# Patient Record
Sex: FEMALE | Race: BLACK OR AFRICAN AMERICAN | NOT HISPANIC OR LATINO | Employment: UNEMPLOYED | URBAN - METROPOLITAN AREA
[De-identification: names, ages, dates, MRNs, and addresses within clinical notes are randomized per-mention and may not be internally consistent; named-entity substitution may affect disease eponyms.]

---

## 2023-06-15 ENCOUNTER — APPOINTMENT (OUTPATIENT)
Dept: CT IMAGING | Facility: CLINIC | Age: 38
End: 2023-06-15
Attending: EMERGENCY MEDICINE

## 2023-06-15 ENCOUNTER — HOSPITAL ENCOUNTER (EMERGENCY)
Facility: CLINIC | Age: 38
Discharge: HOME | End: 2023-06-15
Attending: EMERGENCY MEDICINE | Admitting: EMERGENCY MEDICINE

## 2023-06-15 ENCOUNTER — APPOINTMENT (OUTPATIENT)
Dept: GENERAL RADIOLOGY | Facility: CLINIC | Age: 38
End: 2023-06-15
Attending: EMERGENCY MEDICINE

## 2023-06-15 VITALS
TEMPERATURE: 97.3 F | RESPIRATION RATE: 19 BRPM | WEIGHT: 150 LBS | DIASTOLIC BLOOD PRESSURE: 70 MMHG | OXYGEN SATURATION: 100 % | HEART RATE: 77 BPM | SYSTOLIC BLOOD PRESSURE: 106 MMHG

## 2023-06-15 DIAGNOSIS — R55 SPELL OF LOSS OF CONSCIOUSNESS: ICD-10-CM

## 2023-06-15 DIAGNOSIS — E86.0 DEHYDRATION: ICD-10-CM

## 2023-06-15 DIAGNOSIS — R56.9 FIRST TIME SEIZURE (H): ICD-10-CM

## 2023-06-15 LAB
ALBUMIN SERPL BCG-MCNC: 4.6 G/DL (ref 3.5–5.2)
ALBUMIN UR-MCNC: NEGATIVE MG/DL
ALP SERPL-CCNC: 49 U/L (ref 35–104)
ALT SERPL W P-5'-P-CCNC: 17 U/L (ref 0–50)
ANION GAP SERPL CALCULATED.3IONS-SCNC: 13 MMOL/L (ref 7–15)
APPEARANCE UR: CLEAR
AST SERPL W P-5'-P-CCNC: 18 U/L (ref 0–45)
ATRIAL RATE - MUSE: 87 BPM
BASOPHILS # BLD AUTO: 0 10E3/UL (ref 0–0.2)
BASOPHILS NFR BLD AUTO: 1 %
BILIRUB SERPL-MCNC: 0.4 MG/DL
BILIRUB UR QL STRIP: NEGATIVE
BUN SERPL-MCNC: 11.8 MG/DL (ref 6–20)
CALCIUM SERPL-MCNC: 9.3 MG/DL (ref 8.6–10)
CHLORIDE SERPL-SCNC: 102 MMOL/L (ref 98–107)
COLOR UR AUTO: ABNORMAL
CREAT SERPL-MCNC: 0.5 MG/DL (ref 0.51–0.95)
DEPRECATED HCO3 PLAS-SCNC: 22 MMOL/L (ref 22–29)
DIASTOLIC BLOOD PRESSURE - MUSE: NORMAL MMHG
EOSINOPHIL # BLD AUTO: 0 10E3/UL (ref 0–0.7)
EOSINOPHIL NFR BLD AUTO: 0 %
ERYTHROCYTE [DISTWIDTH] IN BLOOD BY AUTOMATED COUNT: 13.1 % (ref 10–15)
GFR SERPL CREATININE-BSD FRML MDRD: >90 ML/MIN/1.73M2
GLUCOSE SERPL-MCNC: 104 MG/DL (ref 70–99)
GLUCOSE UR STRIP-MCNC: NEGATIVE MG/DL
HCG SERPL QL: NEGATIVE
HCT VFR BLD AUTO: 39.7 % (ref 35–47)
HGB BLD-MCNC: 13.5 G/DL (ref 11.7–15.7)
HGB UR QL STRIP: NEGATIVE
IMM GRANULOCYTES # BLD: 0 10E3/UL
IMM GRANULOCYTES NFR BLD: 0 %
INTERPRETATION ECG - MUSE: NORMAL
KETONES UR STRIP-MCNC: NEGATIVE MG/DL
LACTATE SERPL-SCNC: 1.4 MMOL/L (ref 0.7–2)
LEUKOCYTE ESTERASE UR QL STRIP: NEGATIVE
LYMPHOCYTES # BLD AUTO: 1.6 10E3/UL (ref 0.8–5.3)
LYMPHOCYTES NFR BLD AUTO: 22 %
MCH RBC QN AUTO: 29.5 PG (ref 26.5–33)
MCHC RBC AUTO-ENTMCNC: 34 G/DL (ref 31.5–36.5)
MCV RBC AUTO: 87 FL (ref 78–100)
MONOCYTES # BLD AUTO: 0.6 10E3/UL (ref 0–1.3)
MONOCYTES NFR BLD AUTO: 8 %
NEUTROPHILS # BLD AUTO: 5 10E3/UL (ref 1.6–8.3)
NEUTROPHILS NFR BLD AUTO: 69 %
NITRATE UR QL: NEGATIVE
NRBC # BLD AUTO: 0 10E3/UL
NRBC BLD AUTO-RTO: 0 /100
P AXIS - MUSE: 75 DEGREES
PH UR STRIP: 5 [PH] (ref 5–7)
PLATELET # BLD AUTO: 242 10E3/UL (ref 150–450)
POTASSIUM SERPL-SCNC: 3.4 MMOL/L (ref 3.4–5.3)
PR INTERVAL - MUSE: 166 MS
PROT SERPL-MCNC: 7.8 G/DL (ref 6.4–8.3)
QRS DURATION - MUSE: 88 MS
QT - MUSE: 354 MS
QTC - MUSE: 425 MS
R AXIS - MUSE: 32 DEGREES
RBC # BLD AUTO: 4.58 10E6/UL (ref 3.8–5.2)
RBC URINE: 0 /HPF
SODIUM SERPL-SCNC: 137 MMOL/L (ref 136–145)
SP GR UR STRIP: 1 (ref 1–1.03)
SQUAMOUS EPITHELIAL: 2 /HPF
SYSTOLIC BLOOD PRESSURE - MUSE: NORMAL MMHG
T AXIS - MUSE: 44 DEGREES
TROPONIN T SERPL HS-MCNC: <6 NG/L
TSH SERPL DL<=0.005 MIU/L-ACNC: 2.07 UIU/ML (ref 0.3–4.2)
UROBILINOGEN UR STRIP-MCNC: NORMAL MG/DL
VENTRICULAR RATE- MUSE: 87 BPM
WBC # BLD AUTO: 7.3 10E3/UL (ref 4–11)
WBC URINE: 1 /HPF

## 2023-06-15 PROCEDURE — 36415 COLL VENOUS BLD VENIPUNCTURE: CPT | Performed by: EMERGENCY MEDICINE

## 2023-06-15 PROCEDURE — 84484 ASSAY OF TROPONIN QUANT: CPT | Performed by: EMERGENCY MEDICINE

## 2023-06-15 PROCEDURE — 85025 COMPLETE CBC W/AUTO DIFF WBC: CPT | Performed by: EMERGENCY MEDICINE

## 2023-06-15 PROCEDURE — 96360 HYDRATION IV INFUSION INIT: CPT

## 2023-06-15 PROCEDURE — 84703 CHORIONIC GONADOTROPIN ASSAY: CPT | Performed by: EMERGENCY MEDICINE

## 2023-06-15 PROCEDURE — 258N000003 HC RX IP 258 OP 636: Performed by: EMERGENCY MEDICINE

## 2023-06-15 PROCEDURE — 99285 EMERGENCY DEPT VISIT HI MDM: CPT | Mod: 25

## 2023-06-15 PROCEDURE — 83605 ASSAY OF LACTIC ACID: CPT | Performed by: EMERGENCY MEDICINE

## 2023-06-15 PROCEDURE — 70450 CT HEAD/BRAIN W/O DYE: CPT

## 2023-06-15 PROCEDURE — 73130 X-RAY EXAM OF HAND: CPT | Mod: LT

## 2023-06-15 PROCEDURE — 84443 ASSAY THYROID STIM HORMONE: CPT | Performed by: EMERGENCY MEDICINE

## 2023-06-15 PROCEDURE — 93005 ELECTROCARDIOGRAM TRACING: CPT

## 2023-06-15 PROCEDURE — 72125 CT NECK SPINE W/O DYE: CPT

## 2023-06-15 PROCEDURE — 81001 URINALYSIS AUTO W/SCOPE: CPT | Performed by: EMERGENCY MEDICINE

## 2023-06-15 PROCEDURE — 80053 COMPREHEN METABOLIC PANEL: CPT | Performed by: EMERGENCY MEDICINE

## 2023-06-15 RX ADMIN — SODIUM CHLORIDE 1000 ML: 9 INJECTION, SOLUTION INTRAVENOUS at 17:30

## 2023-06-15 ASSESSMENT — ACTIVITIES OF DAILY LIVING (ADL)
ADLS_ACUITY_SCORE: 35
ADLS_ACUITY_SCORE: 35

## 2023-06-15 NOTE — ED PROVIDER NOTES
History     Chief Complaint:  Seizures       The history is provided by the patient. A  was used (Juanita).      Basilia Dalton is a 38 year old female who presents via EMS after a witnessed seizure. Her daughter explains that earlier this afternoon the patient was walking in the kitchen and suddenly fell. She reportedly hit her head and lost consciousness for around 90 seconds. During this time the patients daughter reports that the patient was jerking. Per EMS, the patient was postictal for the entirety of the drive to the ED. She explains that she does not remember falling, her daughter adds that she did not fall shortly after getting up. The patient explains that prior to this episode she had a headache, palpitations, and some blurred vision. In the ED she endorses nausea, left arm pain, headache, and neck pain. She adds that she has never experienced these symptoms or had a known seizure before. She denies chest pain now. EMS reports that she complained of 4/10 chest pain en route to the ED and was given Aspirin and nitro. Denies any regular medications or known health problems. Her last period was on May 20th.     Independent Historian:   EMS - They report vitals and symptoms en route to ED and Daughter - They report portion of HPI    Review of External Notes:   No old notes or care everywhere    Medications:    No current outpatient medications on file.    Past Medical History:    No past medical history on file.    Past Surgical History:         Physical Exam     Patient Vitals for the past 24 hrs:   BP Temp Temp src Pulse Resp SpO2 Weight   06/15/23 1930 106/70 -- -- 77 19 100 % --   06/15/23 1915 -- -- -- 80 18 99 % --   06/15/23 1900 119/72 -- -- 81 13 99 % --   06/15/23 1845 -- -- -- 82 30 100 % --   06/15/23 1826 100/61 -- -- 84 18 100 % --   06/15/23 1810 -- -- -- 74 15 100 % --   06/15/23 1809 -- -- -- 75 15 -- --   06/15/23 1808 107/61 -- -- 72 -- -- --   06/15/23  1745 113/68 -- -- 83 -- 100 % --   06/15/23 1730 111/67 -- -- 88 18 99 % --   06/15/23 1729 -- -- -- -- -- -- 68 kg (150 lb)   06/15/23 1712 -- 97.3  F (36.3  C) Temporal -- -- -- --   06/15/23 1703 -- -- -- -- 18 100 % --   06/15/23 1700 127/66 -- -- 84 -- -- --        Physical Exam  General: Patient is alert and normal appearing.  HEENT: Head atraumatic.  Specifically no occipital bone tenderness to palpation   Eyes: pupils equal and reactive. Conjunctiva clear   Nares: patent   Oropharynx: no lesions, uvula midline, no palatal draping, normal voice, no trismus  Neck: Supple without lymphadenopathy, no meningismus  Chest: Heart regular rate and rhythm.   Lungs: Equal clear to auscultation with no wheeze or rales  Abdomen: Soft, non tender, nondistended, normal bowel sounds  Back: No costovertebral angle tenderness, no midline C, T or L spine tenderness  Neuro: Grossly nonfocal, normal speech, strength equal bilaterally, CN 2-12 intact  Extremities: No deformities, equal radial and DP pulses. No clubbing, cyanosis.  No edema  Skin: Warm and dry with no rash.       Emergency Department Course   ECG  ECG taken at 1727, ECG read at 1732  Normal sinus rhythm  Normal ECG   Rate 87 bpm. MO interval 166 ms. QRS duration 88 ms. QT/QTc 354/425 ms. P-R-T axes 75 32 44.      Imaging:  CT Head w/o Contrast   Final Result   IMPRESSION:   HEAD CT:   1.  No acute intracranial hemorrhage, extra-axial collection, or midline shift.   2.  Question nondisplaced fracture of the left occipital bone versus prominent nutrient canal, best appreciated on imaging of the cervical spine. Evaluation for focal point tenderness is recommended.      CERVICAL SPINE CT:   1.  No CT evidence for acute fracture or post traumatic subluxation within the cervical spine.      Cervical spine CT w/o contrast   Final Result   IMPRESSION:   HEAD CT:   1.  No acute intracranial hemorrhage, extra-axial collection, or midline shift.   2.  Question nondisplaced  fracture of the left occipital bone versus prominent nutrient canal, best appreciated on imaging of the cervical spine. Evaluation for focal point tenderness is recommended.      CERVICAL SPINE CT:   1.  No CT evidence for acute fracture or post traumatic subluxation within the cervical spine.      XR Hand Left G/E 3 Views   Final Result   IMPRESSION: Normal joint spaces and alignment. No fracture.         Report per radiology    Laboratory:  Labs Ordered and Resulted from Time of ED Arrival to Time of ED Departure   COMPREHENSIVE METABOLIC PANEL - Abnormal       Result Value    Sodium 137      Potassium 3.4      Chloride 102      Carbon Dioxide (CO2) 22      Anion Gap 13      Urea Nitrogen 11.8      Creatinine 0.50 (*)     Calcium 9.3      Glucose 104 (*)     Alkaline Phosphatase 49      AST 18      ALT 17      Protein Total 7.8      Albumin 4.6      Bilirubin Total 0.4      GFR Estimate >90     ROUTINE UA WITH MICROSCOPIC REFLEX TO CULTURE - Abnormal    Color Urine Straw      Appearance Urine Clear      Glucose Urine Negative      Bilirubin Urine Negative      Ketones Urine Negative      Specific Gravity Urine 1.004      Blood Urine Negative      pH Urine 5.0      Protein Albumin Urine Negative      Urobilinogen Urine Normal      Nitrite Urine Negative      Leukocyte Esterase Urine Negative      RBC Urine 0      WBC Urine 1      Squamous Epithelials Urine 2 (*)    LACTIC ACID WHOLE BLOOD - Normal    Lactic Acid 1.4     TROPONIN T, HIGH SENSITIVITY - Normal    Troponin T, High Sensitivity <6     HCG QUALITATIVE PREGNANCY - Normal    hCG Serum Qualitative Negative     TSH WITH FREE T4 REFLEX - Normal    TSH 2.07     CBC WITH PLATELETS AND DIFFERENTIAL    WBC Count 7.3      RBC Count 4.58      Hemoglobin 13.5      Hematocrit 39.7      MCV 87      MCH 29.5      MCHC 34.0      RDW 13.1      Platelet Count 242      % Neutrophils 69      % Lymphocytes 22      % Monocytes 8      % Eosinophils 0      % Basophils 1      %  Immature Granulocytes 0      NRBCs per 100 WBC 0      Absolute Neutrophils 5.0      Absolute Lymphocytes 1.6      Absolute Monocytes 0.6      Absolute Eosinophils 0.0      Absolute Basophils 0.0      Absolute Immature Granulocytes 0.0      Absolute NRBCs 0.0        Emergency Department Course & Assessments:    Interventions:  Medications   0.9% sodium chloride BOLUS (0 mLs Intravenous Stopped 6/15/23 1047)      Assessments:  1654 I obtained history and examined the patient as noted above.  1925 I rechecked the patient and explained findings. She passed her road test and is no longer nauseous or dizzy. Her daughter reports that the patient only slept two hours last night.     Independent Interpretation (X-rays, CTs, rhythm strip):  None    Consultations/Discussion of Management or Tests:  None     Social Determinants of Health affecting care:   None    Disposition:  The patient was discharged to home.     Impression & Plan      Medical Decision Making:  Basilia Dalton is a 38 year old female who presents for evaluation of altered level of consciousness; this was brief and now resolved suspect first-time seizure versus syncope.  EMS reported the patient to be postictal which would lend itself to seizure but patient describes throughout the day feeling lightheaded and tachycardic when standing.  Syncope with myoclonic jerks remains in the differential.  Patient is afebrile and hemodynamically stable.  She did have reduced sleep last night has been under incredible stress.  EKG and troponin without signs of acute coronary syndrome.  Thyroid functions within normal limits.  Head CT scan without intracranial hemorrhage or mass effect.  C-spine CT with no fracture or traumatic injury identified.  There was question of possible occipital bone fracture but she has no tenderness overlying this area and I do not feel that is the case.  Patient feels back to her normal self after IV fluid hydration.  She feels she was  likely somewhat dehydrated as well.    DDx is broad and I of course considered many different etiologies.     I doubt this is primary cardiac arrhythmia.  Patient complained of left hand pain.  X-ray without fracture.  The patients head to toe trauma exam is otherwise negative and reassuring; no further workup indicated.  Precautions given for home.  Patient instructed not to driving Till cleared by neurology.  Return for any further seizure-like activity.  Return for palpitations, dysrhythmia or further loss of conscious episodes.    Diagnosis:    ICD-10-CM    1. Dehydration  E86.0       2. Spell of loss of consciousness  R55       3. First time seizure (H)  R56.9            Discharge Medications:  There are no discharge medications for this patient.     Scribe Disclosure:  I, ALEXA MORTON, am serving as a scribe at 5:22 PM on 6/15/2023 to document services personally performed by Jannette Arriaza MD based on my observations and the provider's statements to me.        Jannette Arriaza MD  06/15/23 2043

## 2023-06-15 NOTE — ED TRIAGE NOTES
Patient comes in via EMS after a witnessed seizure by her daughter.  Patient was standing when she fell to the ground and had shaking for about 1.5 minutes.  Postictal for EMS the entire time until getting here.  Is now alert and oriented.  .  No seizure or otherwise medical history.

## 2023-06-15 NOTE — ED NOTES
Bed: ED06  Expected date:   Expected time:   Means of arrival:   Comments:  William 540 38 F seizure eta 1640 [place] speaking

## 2023-06-16 NOTE — ED NOTES
Patient was able to walk down the castaneda, no assistance needed and patient was able to get dressed on her own.

## 2023-06-16 NOTE — DISCHARGE INSTRUCTIONS
Do not drive until cleared by neurology or your primary doctor.    Discharge Instructions  First Time Seizure (Convulsion)    You have had a spell that may have been a seizure. Many other things can look like a seizure, including a fainting spell, a migraine, psychological issues, and other movement disorders. It can often be hard to know with certainty whether you had a seizure. Your evaluation today is likely not be complete and you may need further testing and evaluation from a neurologist (brain specialist).    Of people who have a seizure, most never have another one. For that reason, anti-seizure medicines are not started in most cases after a first seizure. If you have risk factors for seizures, medication may be started after a first seizure. People are not usually kept in the hospital after a seizure.    During a seizure, there is abnormal and excessive electrical activity in the brain. This can cause changes in awareness, behavior, and abnormal shaking or jerking movements.  This activity usually lasts only a few seconds to minutes. The period following a seizure is called the postictal state. During this time, you may be confused, tired, and you may develop a throbbing headache. Some people develop a brief period of difficulty speaking or experience temporary vision loss, numbness, or weakness.    Generally, every Emergency Department visit should have a follow-up clinic visit with either a primary or a specialty clinic/provider. Please follow-up as instructed by your emergency provider today.    Return to the Emergency Department if:   You have another seizure.  You develop a fever over 100.4 F.  You feel much more ill, or develop new symptoms like severe headache.  You have trouble walking, seeing, or develop weakness or numbness in your arms or legs.     What can I do to help myself?  Do not drive until you have been rechecked by your provider and have been told it is safe to drive.  If you have a  seizure while driving you may cause a motor vehicle accident with injury or death to yourself or others.   Do not swim, climb ladders, or do anything else that would be dangerous if you had another seizure or spell of loss of consciousness, until you are cleared by your provider.    Check your state driving requirements for patients with seizures on the [employer] Website at www.epilepsyfoundation.org/resources/drivingandtravel.cfm.  Do not drink alcohol.  Drinking alcohol increases the risk of seizures and can interfere with the effect of anti-seizure medications.  Take any medication prescribed for you exactly as directed, at the right times, and at the right doses.    If you were given a prescription for medicine here today, be sure to read all of the information (including the package insert) that comes with your prescription.  This will include important information about the medicine, its side effects, and any warnings that you need to know about.  The pharmacist who fills the prescription can provide more information and answer questions you may have about the medicine.  If you have questions or concerns that the pharmacist cannot address, please call or return to the Emergency Department.     Remember that you can always come back to the Emergency Department if you are not able to see your regular provider in the amount of time listed above, if you get any new symptoms, or if there is anything that worries you.

## 2023-08-02 ENCOUNTER — APPOINTMENT (OUTPATIENT)
Dept: INTERPRETER SERVICES | Facility: CLINIC | Age: 38
End: 2023-08-02